# Patient Record
Sex: MALE | Race: BLACK OR AFRICAN AMERICAN
[De-identification: names, ages, dates, MRNs, and addresses within clinical notes are randomized per-mention and may not be internally consistent; named-entity substitution may affect disease eponyms.]

---

## 2020-04-22 ENCOUNTER — HOSPITAL ENCOUNTER (EMERGENCY)
Dept: HOSPITAL 35 - ER | Age: 60
Discharge: HOME | End: 2020-04-22
Payer: COMMERCIAL

## 2020-04-22 VITALS — BODY MASS INDEX: 37.67 KG/M2 | HEIGHT: 67 IN | WEIGHT: 240 LBS

## 2020-04-22 VITALS — DIASTOLIC BLOOD PRESSURE: 85 MMHG | SYSTOLIC BLOOD PRESSURE: 160 MMHG

## 2020-04-22 DIAGNOSIS — I10: ICD-10-CM

## 2020-04-22 DIAGNOSIS — E11.9: ICD-10-CM

## 2020-04-22 DIAGNOSIS — R06.00: Primary | ICD-10-CM

## 2020-04-22 LAB
ALBUMIN SERPL-MCNC: 3.7 G/DL (ref 3.4–5)
ALT SERPL-CCNC: 44 U/L (ref 30–65)
ANION GAP SERPL CALC-SCNC: 9 MMOL/L (ref 7–16)
AST SERPL-CCNC: 13 U/L (ref 15–37)
BASOPHILS NFR BLD AUTO: 0.5 % (ref 0–2)
BILIRUB SERPL-MCNC: 0.4 MG/DL
BUN SERPL-MCNC: 10 MG/DL (ref 7–18)
CALCIUM SERPL-MCNC: 9 MG/DL (ref 8.5–10.1)
CHLORIDE SERPL-SCNC: 105 MMOL/L (ref 98–107)
CO2 SERPL-SCNC: 28 MMOL/L (ref 21–32)
CREAT SERPL-MCNC: 0.8 MG/DL (ref 0.7–1.3)
EOSINOPHIL NFR BLD: 2.2 % (ref 0–3)
ERYTHROCYTE [DISTWIDTH] IN BLOOD BY AUTOMATED COUNT: 14.8 % (ref 10.5–14.5)
GLUCOSE SERPL-MCNC: 95 MG/DL (ref 74–106)
GRANULOCYTES NFR BLD MANUAL: 71.9 % (ref 36–66)
HCT VFR BLD CALC: 39.3 % (ref 42–52)
HGB BLD-MCNC: 13.5 GM/DL (ref 14–18)
LYMPHOCYTES NFR BLD AUTO: 18.8 % (ref 24–44)
MCH RBC QN AUTO: 27.9 PG (ref 26–34)
MCHC RBC AUTO-ENTMCNC: 34.4 G/DL (ref 28–37)
MCV RBC: 81.2 FL (ref 80–100)
MONOCYTES NFR BLD: 6.6 % (ref 1–8)
NEUTROPHILS # BLD: 5 THOU/UL (ref 1.4–8.2)
PLATELET # BLD: 211 THOU/UL (ref 150–400)
POTASSIUM SERPL-SCNC: 3.6 MMOL/L (ref 3.5–5.1)
PROT SERPL-MCNC: 7.4 G/DL (ref 6.4–8.2)
RBC # BLD AUTO: 4.84 MIL/UL (ref 4.5–6)
SODIUM SERPL-SCNC: 142 MMOL/L (ref 136–145)
TROPONIN I SERPL-MCNC: <0.06 NG/ML (ref ?–0.06)
WBC # BLD AUTO: 6.9 THOU/UL (ref 4–11)

## 2020-04-23 NOTE — EKG
[facility]
Shobha [place]   [address]                     ELECTROCARDIOGRAM REPORT      
_______________________________________________________________________________
 
Name:       ORLIN MURRAY                 Room #:                     [facility]      [id number]                       Account #:      [id number]  
Admission:  20    Attend Phys:                          
Discharge:  20    Date of Birth:  [birth year]  
                                                          Report #: [id number]
                                                                    [id number]
_______________________________________________________________________________
THIS REPORT FOR:  
 
cc:  CANDIE - Radha family physician/PCP 
     CANDIE - Radha family physician/PCP 
     Herberth Dowell MD Located within [facility]
THIS REPORT FOR:   //name//                          
 
                         [facility] ED
                                       
Test Date:    2020               Test Time:    18:18:18
Pat Name:     ORLIN MURRAY            Department:   
Patient ID:   [id number]            Room:          
Gender:                               Technician:   AGUSTÍN AUSTEN
:          1960               Requested By: Modesto Barlow
Order Number: [id number] MD:   Herberth Dowell
                                 Measurements
Intervals                              Axis          
Rate:         74                       P:            47
VA:           190                      QRS:          18
QRSD:         102                      T:            19
QT:           387                                    
QTc:          430                                    
                           Interpretive Statements
Sinus rhythm
Poor R wave progression
No previous ECG available for comparison
 
Electronically Signed On 2020 7:45:03 CDT by Herberth Dowell
[email or web address]
 
 
 
 
 
 
 
 
 
 
 
 
 
 
 
  <ELECTRONICALLY SIGNED>
   By: Herberth Dowell MD, FAC   
  20     0745
D: 20                           _____________________________________
T: 20                           Herberth Dowell MD, [facility]     /EPI

## 2022-01-06 ENCOUNTER — HOSPITAL ENCOUNTER (INPATIENT)
Dept: HOSPITAL 35 - ER | Age: 62
LOS: 1 days | Discharge: HOME | DRG: 282 | End: 2022-01-07
Attending: HOSPITALIST | Admitting: HOSPITALIST
Payer: COMMERCIAL

## 2022-01-06 VITALS — HEIGHT: 67.01 IN | WEIGHT: 249 LBS | BODY MASS INDEX: 39.08 KG/M2

## 2022-01-06 VITALS — DIASTOLIC BLOOD PRESSURE: 93 MMHG | SYSTOLIC BLOOD PRESSURE: 143 MMHG

## 2022-01-06 VITALS — SYSTOLIC BLOOD PRESSURE: 136 MMHG | DIASTOLIC BLOOD PRESSURE: 79 MMHG

## 2022-01-06 VITALS — SYSTOLIC BLOOD PRESSURE: 168 MMHG | DIASTOLIC BLOOD PRESSURE: 92 MMHG

## 2022-01-06 VITALS — DIASTOLIC BLOOD PRESSURE: 85 MMHG | SYSTOLIC BLOOD PRESSURE: 140 MMHG

## 2022-01-06 DIAGNOSIS — E87.6: ICD-10-CM

## 2022-01-06 DIAGNOSIS — Z60.2: ICD-10-CM

## 2022-01-06 DIAGNOSIS — I25.10: ICD-10-CM

## 2022-01-06 DIAGNOSIS — K80.20: ICD-10-CM

## 2022-01-06 DIAGNOSIS — E11.9: ICD-10-CM

## 2022-01-06 DIAGNOSIS — I10: ICD-10-CM

## 2022-01-06 DIAGNOSIS — Z20.822: ICD-10-CM

## 2022-01-06 DIAGNOSIS — E83.42: ICD-10-CM

## 2022-01-06 DIAGNOSIS — E66.9: ICD-10-CM

## 2022-01-06 DIAGNOSIS — I21.4: Primary | ICD-10-CM

## 2022-01-06 DIAGNOSIS — E78.00: ICD-10-CM

## 2022-01-06 LAB
ALBUMIN SERPL-MCNC: 3.5 G/DL (ref 3.4–5)
ALT SERPL-CCNC: 42 U/L (ref 16–63)
ANION GAP SERPL CALC-SCNC: 11 MMOL/L (ref 7–16)
AST SERPL-CCNC: 18 U/L (ref 15–37)
BASOPHILS NFR BLD AUTO: 1.4 % (ref 0–2)
BILIRUB SERPL-MCNC: 0.5 MG/DL (ref 0.2–1)
BUN SERPL-MCNC: 10 MG/DL (ref 7–18)
CALCIUM SERPL-MCNC: 8.6 MG/DL (ref 8.5–10.1)
CHLORIDE SERPL-SCNC: 105 MMOL/L (ref 98–107)
CO2 SERPL-SCNC: 26 MMOL/L (ref 21–32)
CREAT SERPL-MCNC: 0.8 MG/DL (ref 0.7–1.3)
EOSINOPHIL NFR BLD: 4.1 % (ref 0–3)
ERYTHROCYTE [DISTWIDTH] IN BLOOD BY AUTOMATED COUNT: 15 % (ref 10.5–14.5)
GLUCOSE SERPL-MCNC: 153 MG/DL (ref 74–106)
GRANULOCYTES NFR BLD MANUAL: 64.1 % (ref 36–66)
HCT VFR BLD CALC: 39.7 % (ref 42–52)
HGB BLD-MCNC: 13.3 GM/DL (ref 14–18)
LIPASE: 41 U/L (ref 73–393)
LYMPHOCYTES NFR BLD AUTO: 19.8 % (ref 24–44)
MCH RBC QN AUTO: 27.2 PG (ref 26–34)
MCHC RBC AUTO-ENTMCNC: 33.4 G/DL (ref 28–37)
MCV RBC: 81.4 FL (ref 80–100)
MONOCYTES NFR BLD: 10.6 % (ref 1–8)
NEUTROPHILS # BLD: 3.9 THOU/UL (ref 1.4–8.2)
PLATELET # BLD: 184 THOU/UL (ref 150–400)
POTASSIUM SERPL-SCNC: 3.4 MMOL/L (ref 3.5–5.1)
PROT SERPL-MCNC: 7.3 G/DL (ref 6.4–8.2)
RBC # BLD AUTO: 4.87 MIL/UL (ref 4.5–6)
SODIUM SERPL-SCNC: 142 MMOL/L (ref 136–145)
WBC # BLD AUTO: 6.1 THOU/UL (ref 4–11)

## 2022-01-06 PROCEDURE — B2111ZZ FLUOROSCOPY OF MULTIPLE CORONARY ARTERIES USING LOW OSMOLAR CONTRAST: ICD-10-PCS | Performed by: INTERNAL MEDICINE

## 2022-01-06 PROCEDURE — B2151ZZ FLUOROSCOPY OF LEFT HEART USING LOW OSMOLAR CONTRAST: ICD-10-PCS | Performed by: INTERNAL MEDICINE

## 2022-01-06 PROCEDURE — 4A023N7 MEASUREMENT OF CARDIAC SAMPLING AND PRESSURE, LEFT HEART, PERCUTANEOUS APPROACH: ICD-10-PCS | Performed by: INTERNAL MEDICINE

## 2022-01-06 SDOH — SOCIAL STABILITY - SOCIAL INSECURITY: PROBLEMS RELATED TO LIVING ALONE: Z60.2

## 2022-01-06 NOTE — EKG
[address] [employer]
[place]  [address]
Phone:  [phone]                    ELECTROCARDIOGRAM REPORT      
_______________________________________________________________________________
 
Name:       ORLIN MURRAY                 Room #:                     REG FRANSISCO MONTALVO#:      [id number]     Account #:      [id number]  
Admission:  22    Attend Phys:                          
Discharge:              Date of Birth:  [birth year]  
                                                          Report #: [id number]
   [id number]
_______________________________________________________________________________
                         [facility] ED
                                       
Test Date:    2022               Test Time:    05:16:22
Pat Name:     ORLIN MURRAY            Department:   
Patient ID:   [id number]            Room:          
Gender:       M                        Technician:   JAYDEN
:          1960               Requested By: Julissa Gomes
Order Number: [id number] MD:   Klever Sutton
                                 Measurements
Intervals                              Axis          
Rate:         85                       P:            30
[place]:           206                      QRS:          23
QRSD:         99                       T:            73
QT:           350                                    
QTc:          417                                    
                           Interpretive Statements
Sinus rhythm
Probable left atrial enlargement
Compared to ECG 2020 18:18:18
Poor R-wave progression no longer present
Electronically Signed On 2022 7:52:05 CST by Klever Sutton
[email or web address]
 
 
 
 
 
 
 
 
 
 
 
 
 
 
 
 
 
 
 
 
 
  <ELECTRONICALLY SIGNED>
   By: Klever Sutton MD, [facility]    
  22     0752
D: 22 0516                           _____________________________________
T: 22                           Klever Sutton MD, FACC      /EPI

## 2022-01-06 NOTE — 2DMMODE
[facility]
[place]   [address]                   2 D/M-MODE ECHOCARDIOGRAM     
_______________________________________________________________________________
 
Name:       ORLIN MURRAY                 Room #:         160-1       ADM Perla 
MNikhilRNikhil#:      [id number]                       Account #:      [id number]  
Admission:  22    Attend Phys:    Rafael Baxter MD    
Discharge:              Date of Birth:  [birth year]  
                                                          Report #: [id number]
                                                                    [id number]
_______________________________________________________________________________
THIS REPORT FOR:  
 
cc:  Cynthia Hernandez MD, Keninde A. MD Santiago, Patrick MD [facility]                                         
                                                                       ~
 
--------------- APPROVED REPORT --------------
 
 
Study performed:  2022 14:31:36
 
EXAM: Comprehensive 2D, Doppler, and color-flow 
Echocardiogram 
Patient Location: ER    
      Status:  routine
 
      BSA:         2.22
HR: 81 bpm BP:          143/92 mmHg 
Rhythm: NSR     
 
Other Information 
Study Quality: Good
 
Indications
Diabetes
Dyspnea 
Chest Pain
Hypertension/HDD
 
2D Dimensions
RVDd:  41.12 mm   
IVSd:  15.34 (7-11mm)  LVOT Diam:  21.06 (18-24mm) 
LVDd:  40.69 mm   
PWd:  13.98 (7-11mm)  Ascending Ao:  34.36 (22-36mm)
LVDs:  29.27 (25-40mm)  
Left Atrium:  34.63 (27-40mm) 
Aortic Root:  29.14 mm  
 
Volumes
Left Atrial Volume (Systole) 
Single Plane 4CH:  24.83 mL Single Plane 2CH:  22.30 mL
    LA ESV Index:  12.00 mL/m2
 
Aortic Valve
AoV Peak Panda.:  0.96 m/s 
 
 
[facility]
[address]
[place]  [address]
Phone:  [phone] 2 D/M-MODE ECHOCARDIOGRAM     
_______________________________________________________________________________
 
Name:            MURRAYORDAZ                 Room #:        [facility]       ADM IN
M.R.#:           [id number]          Account #:     [id number]  
Admission:       22         Attend Phys:   Rafael Baxter MD
Discharge:                  Date of Birth: [birth year]  
                         Report #:      [id number]
        [id number]
_______________________________________________________________________________
AO Peak Gr.:  3.68 mmHg  LVOT Max P.81 mmHg
    LVOT Max V:  0.84 m/s
CHECO Vmax: 3.04 cm2  
 
Mitral Valve
    E/A Ratio:  0.7
    MV Decel. Time:  160.78 ms
MV E Max Panda.:  0.69 m/s 
MV A Panda.:  1.05 m/s  
MV PHT:  46.63 ms  
IVRT:  121.11 ms  
 
Pulmonary Valve
PV Peak Panda.:  0.90 m/s PV Peak Gr.:  3.21 mmHg
 
Pulmonary Vein
P Vein S:    0.36 m/s P Vein A:  0.23 m/s
P Vein D:   0.33 m/s P Vein A Dur.:  114.2 msec
P Vein S/D Ratio:  1.09 
 
Tricuspid Valve
TR Peak Panda.:  2.63 m/s  
TR Peak Gr.:  27.66 mmHg 
    PA Pressure:  33.00 mmHg
 
Left Ventricle
The left ventricle is normal size. There is normal LV segmental wall 
motion. Mild concentric left ventricular hypertrophy. The left 
ventricular systolic function is normal. The left ventricular 
ejection fraction is within the normal range. LVEF is 55-60%. Grade I 
- abnormal relaxation pattern.
 
Right Ventricle
The right ventricle is normal size. The right ventricular systolic 
function is normal.
 
Atria
The left atrium size is normal. The right atrium size is 
normal.
 
Aortic Valve
The aortic valve is normal in structure. No aortic regurgitation is 
present. There is no aortic valvular stenosis.
 
Mitral Valve
The mitral valve is normal in structure. Trace mitral regurgitation. 
 
 
[facility]
[address]
[place]  [address]
Phone:  [phone]                    2 D/M-MODE ECHOCARDIOGRAM     
_______________________________________________________________________________
 
Name:            ORLIN MURRAY                 Room #:        160-1       ADM IN
M.R.#:           [id number]          Account #:     [id number]  
Admission:       22         Attend Phys:   Rafael Baxter MD
Discharge:                  Date of Birth: [birth year]  
                         Report #:      [id number]
        [id number]
_______________________________________________________________________________
No evidence of mitral valve stenosis.
 
Tricuspid Valve
The tricuspid valve is normal in structure. There is trace tricuspid 
regurgitation. Estimated PAP 33 mmHg. There is mild pulmonary 
hypertension.
 
Pulmonic Valve
The pulmonary valve is normal in structure. Trace to mild pulmonic 
regurgitation.
 
Great Vessels
The aortic root is normal in size. IVC is normal in size and 
collapses >50% with inspiration.
 
Pericardium
There is no pericardial effusion.
 
<Conclusion>
Normal left ventricle size
Mild concentric hypertrophy
Ejection fraction 55% with very mild inferior wall hypokineses
Normal right ventricle size/function
Normal atrial size
Normal aortic/mitral valve structure and function
Trace tricuspid valve insufficiency
Pulmonary systolic pressure estimated 33 mmHg
No pericardial effusion
Normal aortic root size
 
 
 
 
 
 
 
 
 
 
 
 
 
 
 
  <ELECTRONICALLY SIGNED>
   By: Klever Sutton MD, FACC    
  22     1505
D: 22 1505                           _____________________________________
T: 22 1505                           Klever Sutton MD, FACC      /INF

## 2022-01-06 NOTE — CATHLAB
[facility]
Shobha [address]
[place]   [address]                   INVASIVE PROCEDURE REPORT     
_______________________________________________________________________________
 
Name:       ORLIN MURRAY                 Room #:         160-1       ADM Perla MONTALVO#:      [id number]                       Account #:      [id number]  
Admission:  01/06/22    Attend Phys:    Rafael Baxter MD    
Discharge:              Date of Birth:  [birth year]  
                                                          Report #: [id number]
                                                                    [id number]
_______________________________________________________________________________
THIS REPORT FOR:  
 
cc:  Cynthia Hernandez MD, Keninde A. MD Park, Jin S. MD                                                   
                                                                       ~
 
--------------- APPROVED REPORT --------------
 
 
Study performed:  01/06/2022 12:21:36
 
Patient Details
Patient Status: ED                  Room #: 
The patient is a 62 year-old male
 
Event Personnel
Juan David De León  Interventional Cardiologist, Alyssa Miranda RN RN, Susana Grover  [facility] [facility] SCRUB
 
Procedures Performed
Art Access - R femoral artery*  Left Heart Cath w/or w/o Coronaries 
[id number] [facility] [address] Initial Mod Sed Same Phys/QHP Gr5y [id number] 61558 Mod 
Sed Same Phys/QHP Ea 255043 Hemostasis w/ Mynx
 
Indication
Non-STEMI , Dyspnea, Chest pain
 
Risk Factors
HypercholesterolemiaPhysical Activity, Hypertension, Diabetes 
 
Procedure Narrative
The Right Groin^ was infiltrated with 1% Lidocaine subcutaneous 
anesthesia. A PINNACLE 6FR Sheath #[id number] sheath was inserted into 
the RFA 4F^. Coronary angiography was performed using coronary 
diagnostic catheters. The right coronary system was accessed and 
visualized with a JR4 catheter. The left coronary system was accessed 
and visualized with a JL4 catheter. The left ventricle was accessed 
and visualized with a ANGLE PIG catheter.
 
Intraoperative Conscious Sedation
Sedation start time:  1233           Case end Time:  
1312    
Fentanyl  50 mcg    Versed  1 mg  
 
Fluoro Time:    3.80 minutes    
 
 
[facility]
[place]  [address]
Phone:  [phone]                    INVASIVE PROCEDURE REPORT     
_______________________________________________________________________________
 
Name:            ORLIN MURRAY                 Room #:        [address] [place] [address] [place]
[facility].#:           [id number]          Account #:     [id number]  
Admission:       01/06/22         Attend Phys:   Rafael Baxter MD
Discharge:                  Date of Birth: [birth year]  
                         Report #:      [id number]
        [id number]
_______________________________________________________________________________
Dose:     DAP 09037.30 cGycm2    
Contrast Type and Amount:  Omnipaque 140 ml   
 
Coronary Angiography
The patient's coronary anatomy is right dominant. 
 
Diagnostic Cath
Left Main The left main artery is a large-caliber vessel, appears 
angiographically normal.
LAD  The LAD is a moderate-sized caliber vessel, traversing the 
anterior wall and terminating at the apex.  This vessel is patent 
with no flow-limiting lesions.
Circumflex There is a moderate-sized caliber vessel, patent with no 
flow-limiting lesions.  Supplies 2 OM vessels.
OM1  This is a moderate-sized caliber vessel, patent in the proximal 
and mid segments.  At the distal segment, it bifurcates into 2 small 
branches.  The superior branch is patent.  The inferior branch has 
severe diffuse disease and medical therapy is recommended.
OM2  This is a small to moderate-sized caliber vessel, patent with no 
flow-limiting lesions.
Right Coronary The RCA is a dominant vessel, appears angiographically 
normal.
R PDA  This is mild to moderate diffuse disease in the midsegment, 30 
to 40%.
RPLV  This is a patent vessel, with no flow-limiting 
lesions.
Ramus  This is a moderate-sized caliber vessel, traversing the 
anterolateral wall, patent with no flow-limiting lesions.
 
Left Ventriculography
The left ventricle is normal in size with normal contractility. The 
left ventricular ejection fraction is estimated to be 
50%.
 
Hemodynamics
The aortic pressure is 158/101 mmHg with a mean of 122 mmHg. The left 
ventricular pressure is 163/18 mmHg with a mean of mmHg. The left 
ventricular end diastolic pressure is 31 mmHg. 
 
Conclusion
1.  The LAD and ramus arteries appear angiographically normal.
2.  There is severe diffuse disease in the terminal branch at the 
distal end of the first obtuse marginal artery.  Medical therapy is 
recommended.
3.  There is mild to moderate diffuse disease in the PDA.
4.  There is borderline normal LV systolic function.
 
 
[facility]
[address] [place]  [address]
Phone:  [phone]                    INVASIVE PROCEDURE REPORT     
_______________________________________________________________________________
 
Name:            ORLIN MURRAY                 Room #:        160-1       ADM IN
M.R.#:           [id number]          Account #:     [id number]  
Admission:       01/06/22         Attend Phys:   Rafael Baxter MD
Discharge:                  Date of Birth: [birth year]  
                         Report #:      [id number]
        [id number]
_______________________________________________________________________________
5.  Recommend risk factor management and guideline directed medical 
therapy.
 
 
 
 
 
 
 
 
 
 
 
 
 
 
 
 
 
 
 
 
 
 
 
 
 
 
 
 
 
 
 
 
 
 
 
 
 
 
 
 
 
 
  <ELECTRONICALLY SIGNED>
   By: Juan David De León MD               
  01/06/22     1513
D: 01/06/22 1513                           _____________________________________
T: 01/06/22 1513                           Juan David De León MD                 /INF

## 2022-01-07 VITALS — SYSTOLIC BLOOD PRESSURE: 129 MMHG | DIASTOLIC BLOOD PRESSURE: 65 MMHG

## 2022-01-07 VITALS — SYSTOLIC BLOOD PRESSURE: 123 MMHG | DIASTOLIC BLOOD PRESSURE: 81 MMHG

## 2022-01-07 VITALS — SYSTOLIC BLOOD PRESSURE: 118 MMHG | DIASTOLIC BLOOD PRESSURE: 70 MMHG

## 2022-01-07 VITALS — DIASTOLIC BLOOD PRESSURE: 81 MMHG | SYSTOLIC BLOOD PRESSURE: 123 MMHG

## 2022-01-07 LAB
ANION GAP SERPL CALC-SCNC: 8 MMOL/L (ref 7–16)
BUN SERPL-MCNC: 9 MG/DL (ref 7–18)
CALCIUM SERPL-MCNC: 8.8 MG/DL (ref 8.5–10.1)
CHLORIDE SERPL-SCNC: 106 MMOL/L (ref 98–107)
CO2 SERPL-SCNC: 27 MMOL/L (ref 21–32)
CREAT SERPL-MCNC: 0.8 MG/DL (ref 0.7–1.3)
ERYTHROCYTE [DISTWIDTH] IN BLOOD BY AUTOMATED COUNT: 15.1 % (ref 10.5–14.5)
GLUCOSE SERPL-MCNC: 125 MG/DL (ref 74–106)
HCT VFR BLD CALC: 39.8 % (ref 42–52)
HGB BLD-MCNC: 13.1 GM/DL (ref 14–18)
MCH RBC QN AUTO: 27.2 PG (ref 26–34)
MCHC RBC AUTO-ENTMCNC: 32.8 G/DL (ref 28–37)
MCV RBC: 82.9 FL (ref 80–100)
PLATELET # BLD: 200 THOU/UL (ref 150–400)
POTASSIUM SERPL-SCNC: 4 MMOL/L (ref 3.5–5.1)
RBC # BLD AUTO: 4.8 MIL/UL (ref 4.5–6)
SODIUM SERPL-SCNC: 141 MMOL/L (ref 136–145)
WBC # BLD AUTO: 8.3 THOU/UL (ref 4–11)

## 2022-01-07 NOTE — NUR
NO EVENTS TONIGHT. PT BEEN IN A GOOD MOOD. UP AD AUNG IN ROOM, ROOM AIR, SR ON
TELEMETRY, RIGHT GROIN WITHOUT ANY HEMATOMA.DENIES PAIN. CALLS WITH NEEDS.

## 2022-01-07 NOTE — EKG
[address] [place]  [address]
Phone:  [phone]                    ELECTROCARDIOGRAM REPORT      
_______________________________________________________________________________
 
Name:       ORLIN MURRAY                 Room #:         [facility] 
M.R.#:      [id number]     Account #:      [id number]  
Admission:  22    Attend Phys:    Rafael Baxter MD    
Discharge:              Date of Birth:  [birth year]  
                                                          Report #: [id number]
   [id number]
_______________________________________________________________________________
                         [facility] ED
                                       
Test Date:    2022               Test Time:    11:59:22
Pat Name:     ORLIN MURRAY            Department:   
Patient ID:   [id number]            Room:         210
Gender:       M                        Technician:   ANDREW
:          1960               Requested By: Smita Grossman
Order Number: [id number] MD:   Klever Sutton
                                 Measurements
Intervals                              Axis          
Rate:         92                       P:            44
IA:           190                      QRS:          26
QRSD:         97                       T:            24
QT:           371                                    
QTc:          459                                    
                           Interpretive Statements
Sinus tachycardia
Ventricular trigeminy
Probable left atrial enlargement
Compared to ECG 2022 05:16:22
Ventricular premature complex(es) now present
Sinus rhythm no longer present
Electronically Signed On 2022 7:45:33 CST by Klever Sutton
[email or web address]
 
 
 
 
 
 
 
 
 
 
 
 
 
 
 
 
 
 
 
  <ELECTRONICALLY SIGNED>
   By: Klever Sutton MD, [facility]    
  22     0745
D: 22 1159                           _____________________________________
T: 22 1159                           Klever Sutton MD, [facility]      /EPI

## 2022-01-07 NOTE — NUR
TOOK OVER CARE OF THIS PATIENT AT 0700. PT AXOX4 AND UP AD AUNG. PT DENIES ANY
CHEST PAIN. PT DENIES ANY NEEDS AT THIS TIME. DISCHARGE EDUCATION PROVIDED BY
THIS NURSE. DENIES ANY QUESTIONS OR CONCERNS. PATIENT AGREEABLE TO DISCHARGE
PLAN. PT DISCHARGED VIA PERSONAL VEHICLE WITH ALL PERSONAL BELONGINGS.